# Patient Record
Sex: MALE | Race: ASIAN | NOT HISPANIC OR LATINO | URBAN - METROPOLITAN AREA
[De-identification: names, ages, dates, MRNs, and addresses within clinical notes are randomized per-mention and may not be internally consistent; named-entity substitution may affect disease eponyms.]

---

## 2023-02-09 ENCOUNTER — EMERGENCY (EMERGENCY)
Facility: HOSPITAL | Age: 63
LOS: 1 days | Discharge: HOME | End: 2023-02-09
Admitting: EMERGENCY MEDICINE
Payer: MEDICAID

## 2023-02-09 VITALS
HEIGHT: 64.57 IN | HEART RATE: 72 BPM | DIASTOLIC BLOOD PRESSURE: 74 MMHG | RESPIRATION RATE: 18 BRPM | OXYGEN SATURATION: 97 % | TEMPERATURE: 98 F | WEIGHT: 108.03 LBS | SYSTOLIC BLOOD PRESSURE: 131 MMHG

## 2023-02-09 VITALS
DIASTOLIC BLOOD PRESSURE: 78 MMHG | SYSTOLIC BLOOD PRESSURE: 122 MMHG | HEART RATE: 70 BPM | RESPIRATION RATE: 18 BRPM | OXYGEN SATURATION: 98 %

## 2023-02-09 PROCEDURE — 99285 EMERGENCY DEPT VISIT HI MDM: CPT

## 2023-02-09 PROCEDURE — 73130 X-RAY EXAM OF HAND: CPT | Mod: 26,LT

## 2023-02-09 NOTE — ED ADULT TRIAGE NOTE - CHIEF COMPLAINT QUOTE
Pt walked in c/o of a laceration to the left hand today after cutting his hand on a . Tdap utd. Pt sent in from  for further evaluation. Dressing in place. Bleeding controlled.

## 2023-02-09 NOTE — ED PROVIDER NOTE - OBJECTIVE STATEMENT
62-year-old male complaining of a laceration sustained to the left hand about 4 hours ago at home.  Patient reports he was using an electrical saw to cut some metal at home and it slipped and cut him on the base of the left first digit.  He went to his primary care doctor his tetanus was updated there today.  He denies numbness or weakness.  He is currently taking Plavix for CAD.  Used Mandarin translation line

## 2023-02-09 NOTE — ED ADULT NURSE NOTE - OBJECTIVE STATEMENT
Pt aox3 with steady gait. Pt walked in c/o of a laceration to the left hand today after cutting his hand on a . tetanus given at urgent care today. Pt sent in from  for further evaluation. Dressing in place. Bleeding controlled.

## 2023-02-09 NOTE — ED PROVIDER NOTE - NSFOLLOWUPINSTRUCTIONS_ED_ALL_ED_FT
Please keep wound clean and dry  Please call and make appointment to see Dr. Ruiz on Tuesday for follow up.  Take antibiotics as prescribed    You may take Tylenol 500 mg every 6-8 hours as needed for pain  Rest. Extremity elevation.    Return for any concerning or worsening symptoms such as fever, severe pain or any concerns.

## 2023-02-09 NOTE — ED PROVIDER NOTE - PHYSICAL EXAMINATION
CONSTITUTIONAL: Well-appearing; well-nourished; in no apparent distress.   LUE: 1inch superficial linear laceration to base of 1st digit dorsal aspect, no bony tenderness or active bleeding. no fb. tendon function intact. nerve function intact with 2 point discrimination. good cap refill. rest of hand nontender.   	NEURO: A & O x 3; face symmetric; grossly unremarkable.   PSYCHOLOGICAL: The patient’s mood and manner are appropriate.

## 2023-02-09 NOTE — ED PROVIDER NOTE - PATIENT PORTAL LINK FT
You can access the FollowMyHealth Patient Portal offered by [facility] by registering at the following website: [email or web address]. By joining [facility]’s FollowMyHealth portal, you will also be able to view your health information using other applications (apps) compatible with our system.

## 2023-02-09 NOTE — ED PROVIDER NOTE - CARE PROVIDER_API CALL
Leopoldo Ruiz)  Plastic Surgery  [address], [address]  [place] [address]  Phone: [phone]  Fax: [phone]  Follow Up Time:

## 2023-02-13 DIAGNOSIS — S61.012A LACERATION WITHOUT FOREIGN BODY OF LEFT THUMB WITHOUT DAMAGE TO NAIL, INITIAL ENCOUNTER: ICD-10-CM
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
## 2023-02-13 DIAGNOSIS — I25.10 ATHEROSCLEROTIC HEART DISEASE OF NATIVE CORONARY ARTERY WITHOUT ANGINA PECTORIS: ICD-10-CM

## 2023-02-13 DIAGNOSIS — Z79.02 LONG TERM (CURRENT) USE OF ANTITHROMBOTICS/ANTIPLATELETS: ICD-10-CM
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
